# Patient Record
Sex: FEMALE | Race: WHITE
[De-identification: names, ages, dates, MRNs, and addresses within clinical notes are randomized per-mention and may not be internally consistent; named-entity substitution may affect disease eponyms.]

---

## 2018-05-01 ENCOUNTER — HOSPITAL ENCOUNTER (OUTPATIENT)
Dept: HOSPITAL 62 - OROUT | Age: 38
Discharge: HOME | End: 2018-05-01
Attending: ORTHOPAEDIC SURGERY
Payer: OTHER GOVERNMENT

## 2018-05-01 VITALS — DIASTOLIC BLOOD PRESSURE: 108 MMHG | SYSTOLIC BLOOD PRESSURE: 156 MMHG
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
## 2018-05-01 DIAGNOSIS — F41.9: ICD-10-CM

## 2018-05-01 DIAGNOSIS — F32.9: ICD-10-CM
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
## 2018-05-01 DIAGNOSIS — S53.422A: Primary | ICD-10-CM

## 2018-05-01 DIAGNOSIS — Z79.899: ICD-10-CM

## 2018-05-01 DIAGNOSIS — M19.90: ICD-10-CM

## 2018-05-01 LAB
ANION GAP SERPL CALC-SCNC: 13 MMOL/L (ref 5–19)
APPEARANCE UR: CLEAR
APTT PPP: YELLOW S
BILIRUB UR QL STRIP: NEGATIVE
BUN SERPL-MCNC: 12 MG/DL (ref 7–20)
CALCIUM: 9.5 MG/DL (ref 8.4–10.2)
CHLORIDE SERPL-SCNC: 107 MMOL/L (ref 98–107)
CO2 SERPL-SCNC: 25 MMOL/L (ref 22–30)
ERYTHROCYTE [DISTWIDTH] IN BLOOD BY AUTOMATED COUNT: 13 % (ref 11.5–14)
GLUCOSE SERPL-MCNC: 83 MG/DL (ref 75–110)
GLUCOSE UR STRIP-MCNC: NEGATIVE MG/DL
HCT VFR BLD CALC: 43.7 % (ref 36–47)
HGB BLD-MCNC: 14.8 G/DL (ref 12–15.5)
KETONES UR STRIP-MCNC: NEGATIVE MG/DL
MCH RBC QN AUTO: 30.2 PG (ref 27–33.4)
MCHC RBC AUTO-ENTMCNC: 33.9 G/DL (ref 32–36)
MCV RBC AUTO: 89 FL (ref 80–97)
NITRITE UR QL STRIP: NEGATIVE
PH UR STRIP: 5 [PH] (ref 5–9)
PLATELET # BLD: 178 10^3/UL (ref 150–450)
POTASSIUM SERPL-SCNC: 4.9 MMOL/L (ref 3.6–5)
PROT UR STRIP-MCNC: NEGATIVE MG/DL
RBC # BLD AUTO: 4.9 10^6/UL (ref 3.72–5.28)
SODIUM SERPL-SCNC: 145 MMOL/L (ref 137–145)
SP GR UR STRIP: 1.02
UROBILINOGEN UR-MCNC: NEGATIVE MG/DL (ref ?–2)
WBC # BLD AUTO: 5.8 10^3/UL (ref 4–10.5)

## 2018-05-01 PROCEDURE — 01740 ANES OPN/ARTHRS PX ELBW NOS: CPT

## 2018-05-01 PROCEDURE — 81025 URINE PREGNANCY TEST: CPT

## 2018-05-01 PROCEDURE — 80048 BASIC METABOLIC PNL TOTAL CA: CPT

## 2018-05-01 PROCEDURE — S0028 INJECTION, FAMOTIDINE, 20 MG: HCPCS

## 2018-05-01 PROCEDURE — 36415 COLL VENOUS BLD VENIPUNCTURE: CPT

## 2018-05-01 PROCEDURE — 81001 URINALYSIS AUTO W/SCOPE: CPT

## 2018-05-01 PROCEDURE — 73070 X-RAY EXAM OF ELBOW: CPT

## 2018-05-01 PROCEDURE — 24343 REPR ELBOW LAT LIGMNT W/TISS: CPT

## 2018-05-01 PROCEDURE — 85027 COMPLETE CBC AUTOMATED: CPT

## 2018-05-01 RX ADMIN — HYDROMORPHONE HYDROCHLORIDE ONE MG: 2 INJECTION INTRAMUSCULAR; INTRAVENOUS; SUBCUTANEOUS at 17:59

## 2018-05-01 RX ADMIN — HYDROMORPHONE HYDROCHLORIDE ONE MG: 2 INJECTION INTRAMUSCULAR; INTRAVENOUS; SUBCUTANEOUS at 17:49

## 2018-05-01 NOTE — RADIOLOGY REPORT (SQ)
EXAM DESCRIPTION:  NO CHG FLUORO; ELBOW LEFT AP/LATERAL



COMPLETED DATE/TIME:  5/1/2018 5:50 pm



REASON FOR STUDY:  LT ELBOW REPAIR, LAT. FIXATOR PLACEMENT



COMPARISON:  None.



FLUOROSCOPY TIME:  1 minute

5 Images saved to PACS



LIMITATIONS:  None.



PROCEDURE:  ORIF left elbow



FINDINGS:  Images obtained with fluoro document the open reduction internal fixation of what appears 
to be a fracture of the proximal ulna.



IMPRESSION:  ORIF left elbow.



COMMENT:  PQRS 6045F:  Fluoroscopy time of the procedure is documented in the report.



TECHNICAL DOCUMENTATION:  JOB ID:  [id number]

 2011 [employer]- All Rights Reserved



Reading location - IP/workstation name: [email or web address]

## 2018-05-01 NOTE — OPERATIVE REPORT
Operative Report


DATE OF SURGERY: 05/01/18


PREOPERATIVE DIAGNOSIS: Untable Left Elbow Dislocation


POSTOPERATIVE DIAGNOSIS: Same


OPERATION: Lateral ulnar collateral ligament repair with placement of internal 

joint stabilizer Open treatment of unstable elbow dislocation


SURGEON: BRITTANY BOTELLO


1ST ASSISTANT: CIPRIANO AUGUSTIN - Required for to retractor placement and 

joint reduction.


COMPLICATIONS: 





None


ESTIMATED BLOOD LOSS: Minimal


PROCEDURE: 





Indication for above procedure:





37-year-old female who sustained a fall resulting in a dislocation of her left 

elbow.  Closed reduction was performed unfortunately patient continued to have 

residual stability of her elbow at that point patient was referred to me for 

possible treatment.  After discussing patient's injury and options decision was 

made to proceed with operative intervention.  Risks and benefits were explained 

patient verbalized understanding consented for the procedure.





Procedure In Detail:





Patient was seen and evaluated in the preoperative holding area.  The LEFT 

upper extremity was initialized and marked.  Patient received 2g of Ancef IV 

for bacterial prophylaxis.  Patient was taken back to the operative room where 

transferred to the operative table and placed under general anesthesia.  Once 

they were adequately anesthetized a nonsterile tourniquet was placed on the 

upper extremity.  A surgical team debriefing was performed ensuring all 

instrumentation was available, the surgical procedure was discussed with 

possible concerns reviewed.  The upper extremity was prepped with ChloraPrep 

and draped in a sterile fashion.   A timeout was done identifying correct 

patient, procedure and extremity everyone in attendance agree with this and 

verbalized no concerns. The extremity was exsanguinated the tourniquet was 

inflated to 250 mmHg.





Examination under anesthesia demonstrated significant instability globally 

throughout the ulnohumeral joint.  Thus decision was made to proceed with open 

treatment.  Skin incision was made at the midpoint between the olecranon and 

lateral epicondyle.  Blunt dissection was performed to the soft tissues.  There 

is significant disruption of the extensor origin and the radial head was 

exposed with complete disruption of the lateral ulnar collateral ligament. 





Inspection of the joint demonstrated chondral fragments within the 

radiocapitellar and ulnohumeral joint with a small chip fracture of the 

coronoid.  The joint was copiously irrigated with normal saline and any free 

loose bodies were removed.  There is also complete disruption of the anterior 

capsule.  I then proceeded with placement of my guidepin within the isometric 

point.





I measured a medium sized capitellum and with the guide placed a pin through 

the lateral epicondyle at the origin of the isometric point.  C-arm fluoroscopy 

was obtained confirming appropriate placement of the guide pin.  Cannulated 

drill was then utilized and the guidepin measured to be approximately 40 mm.  

The locking peg was then placed into position.  And I proceeded with lateral 

ulnar collateral ligament repair.





With a #2 FiberWire a Krakw stitch was placed within the lateral ulnar 

collateral ligament.  Drill holes were placed around the locking peg one 

anterior and one posterior.  While reducing the elbow with flexion and 

pronation this was secured.  There is still residual instability and thus 

decision to proceed with [employer] IJS internal fixator.





The posterior lateral corner of the proximal ulna was exposed.  Once adequate 

exposure was confirmed the baseplate was placed first drilling into the dynamic 

hole aiming towards the coronoid.  C-arm fluoroscopy was obtained confirming 

appropriate placement of the baseplate.  I then completed fixation proximal and 

distal to this one screw within the olecranon tip and the other one obtaining 

fixation into the ulnar shaft.  Once adequate fixation was confirmed I then 

proceeded with closure of the fascia.





The extensor mechanism was reapproximated proximally at its origin with #2 

FiberWire.  The distal fascial split was closed with interrupted 0 Vicryl 

suture.  I then connected the baseplate and the locking peg and reduce the 

ulnohumeral and radiocapitellar joint.  This was then tightened securely.  The 

elbow was ranged from full extension to full flexion there was no evidence of 

instability with range of motion.  Furthermore there is no evidence of 

crepitation or resistance to range of motion throughout elbow flexion extension 

and pronation and supination.  Finally the connecting guide was cut to avoid 

postoperative irritation.





Subcutaneous tissues were then closed with interrupted 3-0 Vicryl suture.  Skin 

was closed with a running subcuticular 4-0 Monocryl reinforced with Dermabond 

and Steri-Strips.  20 cc of 0.5% Marcaine without epinephrine was injected for 

postoperative pain control.  Tourniquet was deflated.  Patient had good 

peripheral perfusion.  Patient placed in a well-padded posterior elbow splint 

at 60 of flexion.





Sponge counts, instrument counts, needle counts counts were correct.  Patient 

was then awoken from anesthesia.  Transferred from the operating room table to 

the operating room stretcher.  There was no intraoperative complications 

patient tolerated procedure well stable to PACU.





Postoperative plan:





Patient will follow-up the office in 2 weeks at which point we will obtain 

radiographs.  Depending on findings radiographically she will begin elbow range 

of motion without limitations.  Anticipate hardware removal 8-10 weeks 

postoperatively.

## 2018-05-01 NOTE — RADIOLOGY REPORT (SQ)
EXAM DESCRIPTION:  NO CHG FLUORO; ELBOW LEFT AP/LATERAL



COMPLETED DATE/TIME:  5/1/2018 5:50 pm



REASON FOR STUDY:  LT ELBOW REPAIR, LAT. FIXATOR PLACEMENT



COMPARISON:  None.



FLUOROSCOPY TIME:  1 minute

5 Images saved to PACS



LIMITATIONS:  None.



PROCEDURE:  ORIF left elbow



FINDINGS:  Images obtained with fluoro document the open reduction internal fixation of what appears 
to be a fracture of the proximal ulna.



IMPRESSION:  ORIF left elbow.



COMMENT:  PQRS 6045F:  Fluoroscopy time of the procedure is documented in the report.



TECHNICAL DOCUMENTATION:  JOB ID:  [id number]

 2011 [employer]- All Rights Reserved



Reading location - IP/workstation name: [email or web address]

## 2018-05-01 NOTE — DISCHARGE SUMMARY
Discharge Summary (SDC)





- Discharge


Final Diagnosis: 





 subluxation of left elbow


Date of Surgery: 05/01/18


Discharge Date: 05/01/18


Condition: Good


Treatment or Instructions: 











Schedule Follow Up w/ Dr. Clyde Johnson @ [facility] to be seen 

in 10-14 days or as scheduled


[place]: [phone] 


[place]: [phone]


[place]: [phone] 





Ice and elevate





Keep splint clean/dry/intact.





If your fingers become numb please unwrap the Ace wrap but leave the splint in 

place, if the sensation does not return within 30 minutes please return to the 

emergency department.





May begin finger range of motion attempting to make full fist.





Please use ibuprofen (Motrin or Advil) 600-800 mg every 8 hours as needed for 

pain or fever DO NOT TAKE w/ TORADOL may use once TORADOL complete.  You may 

also use acetaminophen (Tylenol) 1000 mg every 4-6 hours as needed for pain or 

fever.  Please be aware that many medications contain acetaminophen, do not 

exceed a total of 1000 mg of acetaminophen every 6 hours.  





If ibuprofen and acetaminophen are not sufficient for your pain you may take 

the Percocet/Norco.  Please be aware that the Percocet/Norco does contain 

Tylenol.





Stool softener of choice when on pain medication.


Prescriptions: 


Hydrocodone/Acetaminophen [Hydrocodon-Acetaminophen 5-325] 1 each PO Q6 #30 

tablet


Ketorolac Tromethamine [Toradol 10 mg Tablet] 10 mg PO Q6 #10 tablet


Referrals: 


LIGIA ZHANG PA [Primary Care Provider] - 


Discharge Diet: As Tolerated, Regular


Respiratory Treatments at Home: Deep Breathing/Coughing


Discharge Activity: No Lifting Over 10 Pounds, No Lifting/Push/Pulling, No tub 

bath


Report the Following to Your Physician Immediately: Shortness of Breath, Yellow 

Skin, Fever over 101 Degrees, Swelling, Warmth, Increased Soreness, Drainage-

Yellow

## 2018-07-10 LAB
ANION GAP SERPL CALC-SCNC: 12 MMOL/L (ref 5–19)
APPEARANCE UR: (no result)
APTT PPP: YELLOW S
BILIRUB UR QL STRIP: NEGATIVE
BUN SERPL-MCNC: 17 MG/DL (ref 7–20)
CALCIUM: 9.7 MG/DL (ref 8.4–10.2)
CHLORIDE SERPL-SCNC: 102 MMOL/L (ref 98–107)
CO2 SERPL-SCNC: 28 MMOL/L (ref 22–30)
ERYTHROCYTE [DISTWIDTH] IN BLOOD BY AUTOMATED COUNT: 14.4 % (ref 11.5–14)
GLUCOSE SERPL-MCNC: 80 MG/DL (ref 75–110)
GLUCOSE UR STRIP-MCNC: NEGATIVE MG/DL
HCT VFR BLD CALC: 43.1 % (ref 36–47)
HGB BLD-MCNC: 14.9 G/DL (ref 12–15.5)
KETONES UR STRIP-MCNC: NEGATIVE MG/DL
MCH RBC QN AUTO: 30.5 PG (ref 27–33.4)
MCHC RBC AUTO-ENTMCNC: 34.6 G/DL (ref 32–36)
MCV RBC AUTO: 88 FL (ref 80–97)
NITRITE UR QL STRIP: NEGATIVE
PH UR STRIP: 6 [PH] (ref 5–9)
PLATELET # BLD: 181 10^3/UL (ref 150–450)
POTASSIUM SERPL-SCNC: 4.7 MMOL/L (ref 3.6–5)
PROT UR STRIP-MCNC: NEGATIVE MG/DL
RBC # BLD AUTO: 4.89 10^6/UL (ref 3.72–5.28)
SODIUM SERPL-SCNC: 142.1 MMOL/L (ref 137–145)
SP GR UR STRIP: 1.01
UROBILINOGEN UR-MCNC: NEGATIVE MG/DL (ref ?–2)
WBC # BLD AUTO: 5.3 10^3/UL (ref 4–10.5)

## 2018-07-10 NOTE — RADIOLOGY REPORT (SQ)
EXAM DESCRIPTION:  CHEST PA/LATERAL



COMPLETED DATE/TIME:  7/10/2018 11:04 am



REASON FOR STUDY:  PRE-OP



COMPARISON:  None.



EXAM PARAMETERS:  NUMBER OF VIEWS: two views

TECHNIQUE: Digital Frontal and Lateral radiographic views of the chest acquired.

RADIATION DOSE: NA

LIMITATIONS: none



FINDINGS:  LUNGS AND PLEURA: No opacities, masses or pneumothorax. No pleural effusion.

MEDIASTINUM AND HILAR STRUCTURES: No masses or contour abnormalities.

HEART AND VASCULAR STRUCTURES: Heart normal size.  No evidence for failure.

BONES: No acute findings.

HARDWARE: None in the chest.

OTHER: No other significant finding.



IMPRESSION:  NO SIGNIFICANT RADIOGRAPHIC FINDING IN THE CHEST.



TECHNICAL DOCUMENTATION:  JOB ID:  [id number]

 2011 [employer]- All Rights Reserved



Reading location - IP/workstation name: [email or web address]

## 2018-07-17 ENCOUNTER — HOSPITAL ENCOUNTER (OUTPATIENT)
Dept: HOSPITAL 62 - OROUT | Age: 38
Discharge: HOME | End: 2018-07-17
Attending: ORTHOPAEDIC SURGERY
Payer: OTHER GOVERNMENT

## 2018-07-17 VITALS — DIASTOLIC BLOOD PRESSURE: 85 MMHG | SYSTOLIC BLOOD PRESSURE: 135 MMHG

## 2018-07-17 DIAGNOSIS — Z79.1: ICD-10-CM

## 2018-07-17 DIAGNOSIS — M25.522: ICD-10-CM

## 2018-07-17 DIAGNOSIS — M19.90: ICD-10-CM

## 2018-07-17 DIAGNOSIS — S53.136A: Primary | ICD-10-CM

## 2018-07-17 DIAGNOSIS — M24.521: ICD-10-CM
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
## 2018-07-17 DIAGNOSIS — Z79.899: ICD-10-CM

## 2018-07-17 PROCEDURE — 71046 X-RAY EXAM CHEST 2 VIEWS: CPT

## 2018-07-17 PROCEDURE — 81001 URINALYSIS AUTO W/SCOPE: CPT

## 2018-07-17 PROCEDURE — 93010 ELECTROCARDIOGRAM REPORT: CPT

## 2018-07-17 PROCEDURE — 80048 BASIC METABOLIC PNL TOTAL CA: CPT

## 2018-07-17 PROCEDURE — 93005 ELECTROCARDIOGRAM TRACING: CPT

## 2018-07-17 PROCEDURE — 36415 COLL VENOUS BLD VENIPUNCTURE: CPT

## 2018-07-17 PROCEDURE — 20680 REMOVAL OF IMPLANT DEEP: CPT

## 2018-07-17 PROCEDURE — 73070 X-RAY EXAM OF ELBOW: CPT

## 2018-07-17 PROCEDURE — 01740 ANES OPN/ARTHRS PX ELBW NOS: CPT

## 2018-07-17 PROCEDURE — 85027 COMPLETE CBC AUTOMATED: CPT

## 2018-07-17 PROCEDURE — 81025 URINE PREGNANCY TEST: CPT

## 2018-07-17 NOTE — RADIOLOGY REPORT (SQ)
EXAM DESCRIPTION:  ELBOW LEFT AP/LATERAL; NO CHG FLUORO



COMPLETED DATE/TIME:  7/17/2018 2:38 pm; 7/17/2018 2:37 pm



REASON FOR STUDY:  HARDWARE REMOVAL LEFT ELBOW M25.522  PAIN IN LEFT ELBOW



COMPARISON:  5/1/2018



FLUOROSCOPY TIME:  52 seconds

7 series of digital images saved to PACS.



TECHNIQUE:  Intra-operative images acquired during surgical procedure to evaluate progress.

NUMBER OF IMAGES: 7 digital images



LIMITATIONS:  None.



FINDINGS:  Multiple images are submitted during hardware removal from the left ulna at the elbow.  Pl
ease see the operative report for further details.



IMPRESSION:  Intra procedural imaging and fluoro



COMMENT:  Quality :  Final reports for procedures using fluoroscopy that document radiation exp
osure indices, or exposure time and number of fluorographic images (if radiation exposure indices are
 not available)

Please consult full operative report of the attending physician for description of the procedure.



TECHNICAL DOCUMENTATION:  JOB ID:  [id number]

 2011 [employer]- All Rights Reserved



Reading location - IP/workstation name: [email or web address]

## 2018-07-17 NOTE — DISCHARGE SUMMARY
Discharge Summary (SDC)





- Discharge


Final Diagnosis: 





Status post open reduction internal fixation left elbow dislocation


Date of Surgery: 07/17/18


Discharge Date: 07/17/18


Condition: Good


Forms:  [facility] Anesthesia D/C Instruction, Discharge POC-Surgical Service


Treatment or Instructions: 


Schedule Follow Up w/ Dr. Clyde Botello @ [facility] for [facility] to be seen 

in 10-14 days or as scheduled


[place]: [phone] 


[place]: [phone]


[place]: [phone] 


May remove dressing on postop day #3, keep incision covered and dry.


Ice and elevate


May begin immediate elbow range of motion and Occupational Therapy within 72 

hours


Stool softener of choice when on pain medication.


Prescriptions: 


Hydrocodone/Acetaminophen [Norco 5-325 mg Tablet] 1 tab PO Q6 PRN #30 tablet


 PRN Reason: 


Referrals: 


CLYDE BOTELLO DO [ACTIVE STAFF] - 


Respiratory Treatments at Home: Deep Breathing/Coughing


Discharge Activity: No Lifting Over 10 Pounds, No Lifting/Push/Pulling


Report the Following to Your Physician Immediately: Fever over 101 Degrees, 

Unusual Bleeding, Redness, Swelling, Warmth, Increased Soreness

## 2018-07-17 NOTE — RADIOLOGY REPORT (SQ)
EXAM DESCRIPTION:  ELBOW LEFT AP/LATERAL; NO CHG FLUORO



COMPLETED DATE/TIME:  7/17/2018 2:38 pm; 7/17/2018 2:37 pm



REASON FOR STUDY:  HARDWARE REMOVAL LEFT ELBOW M25.522  PAIN IN LEFT ELBOW



COMPARISON:  5/1/2018



FLUOROSCOPY TIME:  52 seconds

7 series of digital images saved to PACS.



TECHNIQUE:  Intra-operative images acquired during surgical procedure to evaluate progress.

NUMBER OF IMAGES: 7 digital images



LIMITATIONS:  None.



FINDINGS:  Multiple images are submitted during hardware removal from the left ulna at the elbow.  Pl
ease see the operative report for further details.



IMPRESSION:  Intra procedural imaging and fluoro



COMMENT:  Quality :  Final reports for procedures using fluoroscopy that document radiation exp
osure indices, or exposure time and number of fluorographic images (if radiation exposure indices are
 not available)

Please consult full operative report of the attending physician for description of the procedure.



TECHNICAL DOCUMENTATION:  JOB ID:  [id number]

 2011 [employer]- All Rights Reserved



Reading location - IP/workstation name: [email or web address]

## 2018-07-17 NOTE — OPERATIVE REPORT
Operative Report


DATE OF SURGERY: 07/17/18


PREOPERATIVE DIAGNOSIS: Retained painful hardware left elbow status post elbow 

dislocation with internal fixation


POSTOPERATIVE DIAGNOSIS: Same


OPERATION: Removal of hardware left elbow with contracture release


SURGEON: BRITTANY BOTELLO


ANESTHESIA: GA


COMPLICATIONS: 





None


ESTIMATED BLOOD LOSS: Minimal


PROCEDURE: 





Indication for above procedure:





37-year-old female who sustained a elbow dislocation with residual instability 

despite attempted closed reduction.  Patient was sent to me for further 

evaluation and treatment and ultimately underwent open reduction internal 

fixation with placement of internal fixator of the left elbow.  Patient did 

well postoperatively and achieved good range of motion but continued to have 

persistent discomfort at the hardware site along with residual flexion 

contracture.  We discussed treatment options and thus decision was made to 

proceed with operative intervention.





Procedure In Detail:





Patient was seen and evaluated in the preoperative holding area.  The LEFT 

upper extremity was initialized and marked.  Patient received 2g of Ancef IV 

for bacterial prophylaxis.  Patient was taken back to the operative room where 

transferred to the operative table and placed under general anesthesia.  Once 

they were adequately anesthetized a nonsterile tourniquet was placed on the 

upper extremity.  A surgical team debriefing was performed ensuring all 

instrumentation was available, the surgical procedure was discussed with 

possible concerns reviewed.  The upper extremity was prepped with ChloraPrep 

and alcohol and draped in a sterile fashion.   A timeout was done identifying 

correct patient, procedure and extremity everyone in attendance agree with this 

and verbalized no concerns. The extremity was exsanguinated the tourniquet was 

inflated to 250 mmHg.





Previous skin incision was utilized.  Blunt dissection performed and the IJS 

center of rotation pin was identified and removed.  Prior to removing the 

proximal ulnar plate examination under anesthesia was performed.  There was no 

evidence of residual instability patient demonstrated full flexion still 

lacking 30 of extension.   The connection arm into the capitellum was 

identified and adequately removed.  Scar tissue was freed up overlying the 

remaining hardware along the proximal ulna fixation device removed.  The screw 

holes were curetted and irrigated with saline.  I then developed a plane 

anteriorly avoiding dissection along the previous lateral ulnar collateral 

ligament reconstruction.  Via this anterior plane capsule was freed up along 

the anterior aspect of the humerus special attention avoiding dissection 

anteriorly and staying on bone.  Once the capsule was released I was able to 

passively extend to 0 with full elbow flexion greater than 150.  With the use 

of C-arm fluoroscopy I confirmed reduction of the ulnohumeral and 

radiocapitellar joints.  There is no evidence of instability with varus or 

valgus stress.





The wound was copiously irrigated with normal saline.  Previous scar from the 

skin incision was excised.  Deep soft tissues were closed with 2-0 Vicryl.  

Subcutaneous tissues were closed with interrupted 3-0 Monocryl suture.  Skin 

was closed with a running subcuticular 4-0 Monocryl reinforced with Dermabond 

and Steri-Strips.  Patient was placed in a soft dressing.





Sponge counts, instrument counts, needle counts counts were correct.  Patient 

was then awoken from anesthesia.  Transferred from the operating room table to 

the operating room stretcher.  There was no intraoperative complications 

patient tolerated procedure well stable to PACU.





Postoperative plan:





Patient will follow-up the office in 2 weeks for wound check will obtain 

radiographs.  Patient will begin occupational therapy in 72 hours to begin 

range of motion exercises.